# Patient Record
Sex: FEMALE | Race: WHITE | NOT HISPANIC OR LATINO | RURAL
[De-identification: names, ages, dates, MRNs, and addresses within clinical notes are randomized per-mention and may not be internally consistent; named-entity substitution may affect disease eponyms.]

---

## 2017-01-05 ENCOUNTER — OFFICE VISIT (OUTPATIENT)
Dept: FAMILY MEDICINE CLINIC | Facility: CLINIC | Age: 52
End: 2017-01-05

## 2017-01-05 ENCOUNTER — HOSPITAL ENCOUNTER (OUTPATIENT)
Dept: OTHER | Facility: HOSPITAL | Age: 52
Discharge: HOME | End: 2017-01-05
Attending: NURSE PRACTITIONER | Admitting: NURSE PRACTITIONER

## 2017-01-05 VITALS
HEART RATE: 75 BPM | RESPIRATION RATE: 16 BRPM | DIASTOLIC BLOOD PRESSURE: 74 MMHG | TEMPERATURE: 97.9 F | HEIGHT: 62 IN | BODY MASS INDEX: 20.98 KG/M2 | WEIGHT: 114 LBS | SYSTOLIC BLOOD PRESSURE: 126 MMHG

## 2017-01-05 DIAGNOSIS — R56.9 SEIZURE (HCC): ICD-10-CM

## 2017-01-05 DIAGNOSIS — M54.5 CHRONIC LOW BACK PAIN, UNSPECIFIED BACK PAIN LATERALITY, WITH SCIATICA PRESENCE UNSPECIFIED: Primary | ICD-10-CM

## 2017-01-05 DIAGNOSIS — E87.6 HYPOKALEMIA: ICD-10-CM

## 2017-01-05 DIAGNOSIS — R06.2 WHEEZING: ICD-10-CM

## 2017-01-05 DIAGNOSIS — J45.909 UNCOMPLICATED ASTHMA, UNSPECIFIED ASTHMA SEVERITY: ICD-10-CM

## 2017-01-05 DIAGNOSIS — G89.29 CHRONIC LOW BACK PAIN, UNSPECIFIED BACK PAIN LATERALITY, WITH SCIATICA PRESENCE UNSPECIFIED: Primary | ICD-10-CM

## 2017-01-05 DIAGNOSIS — I10 ESSENTIAL HYPERTENSION: ICD-10-CM

## 2017-01-05 PROBLEM — M54.50 CHRONIC LOW BACK PAIN: Status: ACTIVE | Noted: 2017-01-05

## 2017-01-05 PROCEDURE — 99214 OFFICE O/P EST MOD 30 MIN: CPT | Performed by: NURSE PRACTITIONER

## 2017-01-05 RX ORDER — ALBUTEROL SULFATE 90 UG/1
2 AEROSOL, METERED RESPIRATORY (INHALATION) EVERY 4 HOURS PRN
Qty: 18 G | Refills: 3 | Status: SHIPPED | OUTPATIENT
Start: 2017-01-05

## 2017-01-05 RX ORDER — POTASSIUM CHLORIDE 20 MEQ/1
20 TABLET, EXTENDED RELEASE ORAL DAILY
Qty: 30 TABLET | Refills: 5 | Status: SHIPPED | OUTPATIENT
Start: 2017-01-05

## 2017-01-05 RX ORDER — TRAMADOL HYDROCHLORIDE 50 MG/1
50 TABLET ORAL EVERY 6 HOURS PRN
Qty: 60 TABLET | Refills: 0 | Status: SHIPPED | OUTPATIENT
Start: 2017-01-05

## 2017-01-05 NOTE — MR AVS SNAPSHOT
Rosa Guillen   1/5/2017 10:00 AM   Office Visit    Dept Phone:  [phone]   Encounter #:  [id number]    Provider:  ARMANDO Acuna   Department:  [facility] [place]                Your Full Care Plan              Today's Medication Changes          These changes are accurate as of: 1/5/17 11:46 AM.  If you have any questions, ask your nurse or doctor.               New Medication(s)Ordered:     albuterol 108 (90 BASE) MCG/ACT inhaler   Commonly known as:  PROVENTIL HFA;VENTOLIN HFA   Inhale 2 puffs Every 4 (Four) Hours As Needed for wheezing.   Started by:  ARMANDO Acuna       traMADol 50 MG tablet   Commonly known as:  ULTRAM   Take 1 tablet by mouth Every 6 (Six) Hours As Needed for moderate pain (4-6).   Started by:  ARMANDO Acuna            Where to Get Your Medications      These medications were sent to [facility] - [address] - [phone] [place] [id number] [address] [address]     Phone:  [phone]     albuterol 108 (90 BASE) MCG/ACT inhaler    potassium chloride 20 MEQ CR tablet         You can get these medications from any pharmacy     Bring a paper prescription for each of these medications     traMADol 50 MG tablet                  Your Updated Medication List          This list is accurate as of: 1/5/17 11:46 AM.  Always use your most recent med list.                albuterol 108 (90 BASE) MCG/ACT inhaler   Commonly known as:  PROVENTIL HFA;VENTOLIN HFA   Inhale 2 puffs Every 4 (Four) Hours As Needed for wheezing.       amLODIPine 5 MG tablet   Commonly known as:  NORVASC       BLACK COHOSH PO       CloNIDine 0.1 MG tablet   Commonly known as:  CATAPRES       COMBIVENT RESPIMAT  MCG/ACT inhaler   Generic drug:  ipratropium-albuterol       furosemide 20 MG tablet   Commonly known as:  LASIX       naproxen sodium 220 MG  tablet   Commonly known as:  ALEVE       potassium chloride 20 MEQ CR tablet   Commonly known as:  K-DUR,KLOR-CON   Take 1 tablet by mouth Daily.       topiramate 100 MG tablet   Commonly known as:  TOPAMAX       traMADol 50 MG tablet   Commonly known as:  ULTRAM   Take 1 tablet by mouth Every 6 (Six) Hours As Needed for moderate pain (4-6).               We Performed the Following     XR Spine Lumbar 2 or 3 View (In Office)       You Were Diagnosed With        Codes Comments    Hypokalemia    -  Primary ICD-10-CM: E87.6  ICD-9-CM: 276.8     Chronic low back pain, unspecified back pain laterality, with sciatica presence unspecified     ICD-10-CM: M54.5, G89.29  ICD-9-CM: 724.2, 338.29     Wheezing     ICD-10-CM: R06.2  ICD-9-CM: 786.07       Instructions     None    Patient Instructions History      Upcoming Appointments     Visit Type Date Time Department    NEW PATIENT 2017 10:00 AM [facility]    OFFICE VISIT 2017  3:30 PM [facility] CHELITA JO      [employer] Signup     [facility] [employer] allows you to send messages to your doctor, view your test results, renew your prescriptions, schedule appointments, and more. To sign up, go to [email or web address] and click on the Sign Up Now link in the New User? box. Enter your [employer] Activation Code exactly as it appears below along with the last four digits of your Social Security Number and your Date of Birth () to complete the sign-up process. If you do not sign up before the expiration date, you must request a new code.    [employer] Activation Code: [email or web address]  Expires: 2017 11:46 AM    If you have questions, you can email [email or web address] or call [phone] to talk to our [employer] staff. Remember, [employer] is NOT to be used for urgent needs. For medical emergencies, dial 911.               Other Info from Your Visit           Your Appointments     2017  3:30 PM CST   Office Visit with Agapito Lal MD   [facility]  "[facility] (--)    [address] [place] [address] [address]  [address] [facility] [address] [phone]           Arrive 15 minutes prior to appointment.              Allergies     Amplin [Ampicillin]      Aspirin      Benadryl [Diphenhydramine]      Demerol [Meperidine]      Penicillins      Sulfa Antibiotics      Tetracyclines & Related        Reason for Visit     Establish Care           Vital Signs     Blood Pressure Pulse Temperature Respirations Height Weight    126/74 75 97.9 °F (36.6 °C) 16 62\" (157.5 cm) 114 lb (51.7 kg)    Body Mass Index Smoking Status                20.85 kg/m2 Current Every Day Smoker          Problems and Diagnoses Noted     Hypokalemia    -  Primary    Chronic low back pain, unspecified back pain laterality, with sciatica presence unspecified        Wheezing            "

## 2017-01-05 NOTE — PROGRESS NOTES
Subjective   Rosa Guillen is a 51 y.o. female.     Back Pain   This is a chronic (has chronic scoliosis.  has had surgery on her back several times, starting at age 10.) problem. The current episode started more than 1 year ago. The problem occurs constantly. The problem has been waxing and waning since onset. The pain is present in the lumbar spine. The pain does not radiate. The pain is at a severity of 8/10. The pain is severe. The pain is the same all the time. The symptoms are aggravated by bending, standing and position. Stiffness is present all day. Pertinent negatives include no abdominal pain, bladder incontinence, bowel incontinence, chest pain, dysuria, fever, headaches, leg pain, numbness, paresis, paresthesias, pelvic pain, perianal numbness, tingling, weakness or weight loss. Risk factors include lack of exercise. She has tried NSAIDs (has had narcotic pain managment in the past.) for the symptoms. The treatment provided significant relief.   Seizures    This is a chronic (she says she has had seizures since age 7) problem. The problem has not changed since onset.Pertinent negatives include no headaches, no chest pain and no cough. Characteristics do not include bowel incontinence or bladder incontinence.   Breathing Problem   She complains of difficulty breathing, shortness of breath and wheezing. There is no cough. Primary symptoms comments: Has asthma. This is a chronic problem. The current episode started more than 1 year ago. The problem has been waxing and waning. Pertinent negatives include no chest pain, fever, headaches or weight loss. Her symptoms are aggravated by nothing. Her symptoms are not alleviated by beta-agonist. Her past medical history is significant for asthma.   Hypertension   This is a chronic problem. The current episode started more than 1 year ago. The problem is controlled. Associated symptoms include shortness of breath. Pertinent negatives include no chest pain or  headaches. Agents associated with hypertension include NSAIDs and estrogens. Risk factors for coronary artery disease include post-menopausal state. Past treatments include calcium channel blockers and central alpha agonists. The current treatment provides significant improvement. There are no compliance problems.  There is no history of angina, kidney disease, CAD/MI, CVA, heart failure, left ventricular hypertrophy, PVD or retinopathy.        The following portions of the patient's history were reviewed and updated as appropriate: allergies, current medications, past family history, past medical history, past social history, past surgical history and problem list.    Review of Systems   Constitutional: Negative.  Negative for fever and weight loss.   Respiratory: Positive for shortness of breath and wheezing. Negative for cough.    Cardiovascular: Negative.  Negative for chest pain.   Gastrointestinal: Negative for abdominal pain and bowel incontinence.   Genitourinary: Negative for bladder incontinence, dysuria and pelvic pain.   Musculoskeletal: Positive for back pain.   Neurological: Positive for seizures. Negative for tingling, weakness, numbness, headaches and paresthesias.   Psychiatric/Behavioral: Negative.        Objective   Physical Exam   Constitutional: She is oriented to person, place, and time. She appears well-developed and well-nourished. No distress.   HENT:   Head: Normocephalic.   Eyes: Pupils are equal, round, and reactive to light.   Neck: Normal range of motion. Neck supple. No thyromegaly present.   Cardiovascular: Normal rate, regular rhythm and normal heart sounds.  Exam reveals no friction rub.    No murmur heard.  Pulmonary/Chest: Effort normal and breath sounds normal. No respiratory distress. She has no wheezes.   Abdominal: Soft. Bowel sounds are normal. She exhibits no distension.   Musculoskeletal:        Lumbar back: She exhibits decreased range of motion, tenderness, pain and  spasm.   X rays are reviewed and shows scoliosis.   Neurological: She is alert and oriented to person, place, and time.   Skin: Skin is warm and dry.   Psychiatric: She has a normal mood and affect. Thought content normal.   Nursing note and vitals reviewed.      Assessment/Plan   Rosa was seen today for establish care.    Diagnoses and all orders for this visit:    Chronic low back pain, unspecified back pain laterality, with sciatica presence unspecified  -     XR Spine Lumbar 2 or 3 View (In Office)  -     traMADol (ULTRAM) 50 MG tablet; Take 1 tablet by mouth Every 6 (Six) Hours As Needed for moderate pain (4-6).    Hypokalemia  -     potassium chloride (K-DUR,KLOR-CON) 20 MEQ CR tablet; Take 1 tablet by mouth Daily.    Wheezing  -     albuterol (PROVENTIL HFA;VENTOLIN HFA) 108 (90 BASE) MCG/ACT inhaler; Inhale 2 puffs Every 4 (Four) Hours As Needed for wheezing.    Essential hypertension    Seizure    Uncomplicated asthma, unspecified asthma severity      She is given tramadol for her back pain.  Also she will be sent to pain management.  She also wants to see a pulmonologist and a neurologist.  These appointments will be made.

## 2017-01-06 RX ORDER — POLYETHYLENE GLYCOL 3350 17 G/17G
17 POWDER, FOR SOLUTION ORAL DAILY
Qty: 250 G | Refills: 0 | Status: SHIPPED | OUTPATIENT
Start: 2017-01-06